# Patient Record
Sex: MALE | Race: AMERICAN INDIAN OR ALASKA NATIVE
[De-identification: names, ages, dates, MRNs, and addresses within clinical notes are randomized per-mention and may not be internally consistent; named-entity substitution may affect disease eponyms.]

---

## 2019-08-08 ENCOUNTER — HOSPITAL ENCOUNTER (EMERGENCY)
Dept: HOSPITAL 5 - ED | Age: 19
LOS: 1 days | Discharge: HOME | End: 2019-08-09
Payer: SELF-PAY

## 2019-08-08 VITALS — SYSTOLIC BLOOD PRESSURE: 137 MMHG | DIASTOLIC BLOOD PRESSURE: 70 MMHG

## 2019-08-08 DIAGNOSIS — F12.10: ICD-10-CM

## 2019-08-08 DIAGNOSIS — J30.9: ICD-10-CM

## 2019-08-08 DIAGNOSIS — J06.9: Primary | ICD-10-CM

## 2019-08-08 DIAGNOSIS — H10.13: ICD-10-CM

## 2019-08-08 DIAGNOSIS — Z79.899: ICD-10-CM

## 2019-08-08 DIAGNOSIS — Z79.1: ICD-10-CM

## 2019-08-08 PROCEDURE — 99282 EMERGENCY DEPT VISIT SF MDM: CPT

## 2019-08-09 NOTE — EMERGENCY DEPARTMENT REPORT
- General


Chief Complaint: Allergic Reaction


Stated Complaint: POSSIBLE ALLERGIC REACTION


Time Seen by Provider: 08/09/19 01:15


Source: patient


Mode of arrival: Ambulatory


Limitations: No Limitations





- History of Present Illness


Initial Comments: 





Patient is a 19-year-old male with no past medical history presents to the ED 

with complaint of acute onset persistent itchy erythematous bilateral 

conjunctiva with tearing, nasal and sinus congestion and hoarseness for the last

2 hours.  Patient denies chest pain, shortness of breath, cough, wheezing, 

fever, chills, headache, sore throat, swollen lips or tongue, dysphagia, 

dysphonia, rashes or abdominal pain, nausea and vomiting or diarrhea.


MD Complaint: rhinorrhea, nasal congestion, sinus pain (mnk ), other 

(erythematous itchy bilateral conjunctiva)


-: Sudden, hour(s) (2)


Severity: moderate


Severity scale (0 -10): 4


Quality: burning, other (itching)


Consistency: constant


Improves With: nothing


Worsens With: nothing


Context: other (unknown)


Associated Symptoms: denies other symptoms, rhinorrhea, nasal congestion.  

denies: fever, chills, myalgias, diaphoresis, headache, stiff neck, cough, chest

pain, shortness of breath, abdominal pain, nausea, vomiting, dysuria, rash, 

confusion, right sweats, epistaxis, ear pain, other


Treatments Prior to Arrival: none





- Related Data


                                  Previous Rx's











 Medication  Instructions  Recorded  Last Taken  Type


 


Cetirizine HCl [ZyrTEC 10mg cap] 10 mg PO DAILY #30 capsule 08/09/19 Unknown Rx


 


Fluticasone [Flonase] 1 spray NS QDAY #1 bottle 08/09/19 Unknown Rx


 


Ibuprofen [Motrin] 600 mg PO Q8H PRN #15 tablet 08/09/19 Unknown Rx


 


Ketotifen Fumarate [Alaway] 2 drops OP Q12H #10 ml 08/09/19 Unknown Rx


 


diphenhydrAMINE [Benadryl CAP] 25 mg PO QHS PRN #20 capsule 08/09/19 Unknown Rx











                                    Allergies











Allergy/AdvReac Type Severity Reaction Status Date / Time


 


No Known Allergies Allergy   Unverified 08/08/19 23:37














ED Review of Systems


ROS: 


Stated complaint: POSSIBLE ALLERGIC REACTION


Other details as noted in HPI





Constitutional: denies: chills, fever


Eyes: other (diffuse itchy erythematous bilateral conjunctiva and tearing).  

denies: eye pain, eye discharge, vision change


ENT: congestion.  denies: ear pain, throat pain, dental pain, hearing loss, 

epistaxis


Respiratory: denies: cough, shortness of breath, wheezing


Cardiovascular: denies: chest pain, palpitations


Endocrine: no symptoms reported


Gastrointestinal: denies: abdominal pain, nausea, diarrhea


Genitourinary: denies: urgency, dysuria


Musculoskeletal: denies: back pain, joint swelling, arthralgia


Skin: denies: rash, lesions


Neurological: denies: headache, weakness, paresthesias


Psychiatric: denies: anxiety, depression


Hematological/Lymphatic: denies: easy bleeding, easy bruising





ED Past Medical Hx





- Past Medical History


Previous Medical History?: No





- Surgical History


Past Surgical History?: No





- Social History


Smoking Status: Never Smoker


Substance Use Type: Marijuana





- Medications


Home Medications: 


                                Home Medications











 Medication  Instructions  Recorded  Confirmed  Last Taken  Type


 


Cetirizine HCl [ZyrTEC 10mg cap] 10 mg PO DAILY #30 capsule 08/09/19  Unknown Rx


 


Fluticasone [Flonase] 1 spray NS QDAY #1 bottle 08/09/19  Unknown Rx


 


Ibuprofen [Motrin] 600 mg PO Q8H PRN #15 tablet 08/09/19  Unknown Rx


 


Ketotifen Fumarate [Alaway] 2 drops OP Q12H #10 ml 08/09/19  Unknown Rx


 


diphenhydrAMINE [Benadryl CAP] 25 mg PO QHS PRN #20 capsule 08/09/19  Unknown Rx














ED Physical Exam





- General


Limitations: No Limitations


General appearance: alert, in no apparent distress





- Head


Head exam: Present: atraumatic, normocephalic, normal inspection





- Eye


Eye exam: Present: normal appearance, PERRL, EOMI, other (Diffuse bilateral 

erythematous conjunctiva with tearing)


Pupils: Present: normal accommodation





- ENT


ENT exam: Present: normal orophraynx, mucous membranes moist, TM's normal 

bilaterally, normal external ear exam, other (grossly congested nasal passages 

with rhinorrhea)





- Neck


Neck exam: Present: normal inspection, full ROM.  Absent: tenderness, 

meningismus, lymphadenopathy, thyromegaly





- Respiratory


Respiratory exam: Present: normal lung sounds bilaterally.  Absent: respiratory 

distress, wheezes, rales, rhonchi, chest wall tenderness, accessory muscle use, 

prolonged expiratory





- Cardiovascular


Cardiovascular Exam: Present: regular rate, normal rhythm, normal heart sounds. 

Absent: systolic murmur, diastolic murmur, rubs, gallop





- GI/Abdominal


GI/Abdominal exam: Present: soft, normal bowel sounds.  Absent: distended, 

tenderness, guarding, rebound, hyperactive bowel sounds, hypoactive bowel 

sounds, organomegaly, mass





- Rectal


Rectal exam: Present: deferred





- Extremities Exam


Extremities exam: Present: normal inspection, normal capillary refill.  Absent: 

full ROM, tenderness, pedal edema, joint swelling





- Back Exam


Back exam: Present: normal inspection, full ROM.  Absent: tenderness, CVA 

tenderness (R), CVA tenderness (L), muscle spasm, paraspinal tenderness, 

vertebral tenderness





- Neurological Exam


Neurological exam: Present: alert, oriented X3, CN II-XII intact, normal gait, 

reflexes normal





- Psychiatric


Psychiatric exam: Present: normal affect, normal mood





- Skin


Skin exam: Present: warm, dry, intact, normal color.  Absent: rash





ED Course





                                   Vital Signs











  08/08/19





  23:24


 


Temperature 97.6 F


 


Pulse Rate 60


 


Respiratory 18





Rate 


 


Blood Pressure 137/70


 


O2 Sat by Pulse 94





Oximetry 














- Reevaluation(s)


Reevaluation #1: 





08/09/19 02:02


This is a 19-year-old American male who presents to the ED with erythematous 

conjunctiva bilaterally with nasal congestion.  The vital signs are stable and 

patient is alert and oriented 3 and is not in distress.  Patient was treated 

for acute allergic reaction and allergic conjunctivitis.  Patient was discharged

home on medications and advised to follow-up with his primary care physician in 

5-7 days for reevaluation or return to the ED immediately if symptoms get worse.





ED Medical Decision Making





- Medical Decision Making





This is a 19-year-old American male who presents to the ED with erythematous 

conjunctiva bilaterally with nasal congestion.  The vital signs are stable and 

patient is alert and oriented 3 and is not in distress.  Patient was treated 

for acute allergic reaction and allergic conjunctivitis.  Patient was discharged

home on medications and advised to follow-up with his primary care physician in 

5-7 days for reevaluation or return to the ED immediately if symptoms get worse.





- Differential Diagnosis


allergic conjunctivitis; acute URI; allergic rhinitis


Critical care attestation.: 


If time is entered above; I have spent that time in minutes in the direct care 

of this critically ill patient, excluding procedure time.








ED Disposition


Clinical Impression: 


 Acute upper respiratory infection





Allergic conjunctivitis and rhinitis


Qualifiers:


 Laterality: bilateral Qualified Code(s): H10.13 - Acute atopic conjunctivitis, 

bilateral; J30.9 - Allergic rhinitis, unspecified





Disposition: DC-01 TO HOME OR SELFCARE


Is pt being admited?: No


Does the pt Need Aspirin: No


Condition: Stable


Instructions:  Allergic Rhinitis (ED), Conjunctivitis (ED), Upper Respiratory 

Infection (ED)


Additional Instructions: 


Take medications with food, drink plenty of fluids and follow-up with your 

primary care physician in 5-7 days for reevaluation.  Return to the ED 

immediately if symptoms get worse.


Prescriptions: 


diphenhydrAMINE [Benadryl CAP] 25 mg PO QHS PRN #20 capsule


 PRN Reason: Allergic Reaction


Ketotifen Fumarate [Alaway] 2 drops OP Q12H #10 ml


Fluticasone [Flonase] 1 spray NS QDAY #1 bottle


Ibuprofen [Motrin] 600 mg PO Q8H PRN #15 tablet


 PRN Reason: Pain


Cetirizine HCl [ZyrTEC 10mg cap] 10 mg PO DAILY #30 capsule


Referrals: 


[facility] [Outside] - 3-5 Days


Time of Disposition: 01:49


Print Language: ENGLISH